# Patient Record
Sex: MALE | Race: WHITE | NOT HISPANIC OR LATINO | Employment: OTHER | URBAN - METROPOLITAN AREA
[De-identification: names, ages, dates, MRNs, and addresses within clinical notes are randomized per-mention and may not be internally consistent; named-entity substitution may affect disease eponyms.]

---

## 2020-07-05 ENCOUNTER — LAB REQUISITION (OUTPATIENT)
Dept: LAB | Facility: HOSPITAL | Age: 65
End: 2020-07-05

## 2020-07-05 DIAGNOSIS — Z00.00 ROUTINE GENERAL MEDICAL EXAMINATION AT A HEALTH CARE FACILITY: ICD-10-CM

## 2020-07-05 LAB
ANION GAP SERPL CALCULATED.3IONS-SCNC: 14.6 MMOL/L (ref 5–15)
BUN SERPL-MCNC: 13 MG/DL (ref 8–23)
BUN/CREAT SERPL: 15.5 (ref 7–25)
CALCIUM SPEC-SCNC: 8.5 MG/DL (ref 8.6–10.5)
CHLORIDE SERPL-SCNC: 103 MMOL/L (ref 98–107)
CO2 SERPL-SCNC: 23.4 MMOL/L (ref 22–29)
CREAT SERPL-MCNC: 0.84 MG/DL (ref 0.76–1.27)
GFR SERPL CREATININE-BSD FRML MDRD: 92 ML/MIN/1.73
GLUCOSE SERPL-MCNC: 152 MG/DL (ref 65–99)
POTASSIUM SERPL-SCNC: 2.6 MMOL/L (ref 3.5–5.2)
SODIUM SERPL-SCNC: 141 MMOL/L (ref 136–145)

## 2020-07-05 PROCEDURE — 80048 BASIC METABOLIC PNL TOTAL CA: CPT

## 2020-07-07 ENCOUNTER — LAB REQUISITION (OUTPATIENT)
Dept: LAB | Facility: HOSPITAL | Age: 65
End: 2020-07-07

## 2020-07-07 DIAGNOSIS — Z00.00 ROUTINE GENERAL MEDICAL EXAMINATION AT A HEALTH CARE FACILITY: ICD-10-CM

## 2020-07-07 LAB
MAGNESIUM SERPL-MCNC: 2 MG/DL (ref 1.6–2.4)
POTASSIUM SERPL-SCNC: 3.3 MMOL/L (ref 3.5–5.2)

## 2020-07-07 PROCEDURE — 83735 ASSAY OF MAGNESIUM: CPT

## 2020-07-07 PROCEDURE — 84132 ASSAY OF SERUM POTASSIUM: CPT

## 2021-06-21 ENCOUNTER — OFFICE VISIT (OUTPATIENT)
Dept: GASTROENTEROLOGY | Facility: CLINIC | Age: 66
End: 2021-06-21

## 2021-06-21 VITALS
DIASTOLIC BLOOD PRESSURE: 74 MMHG | WEIGHT: 185 LBS | BODY MASS INDEX: 24.52 KG/M2 | TEMPERATURE: 98.1 F | HEIGHT: 73 IN | HEART RATE: 66 BPM | SYSTOLIC BLOOD PRESSURE: 134 MMHG

## 2021-06-21 DIAGNOSIS — D64.9 ANEMIA, UNSPECIFIED TYPE: ICD-10-CM

## 2021-06-21 DIAGNOSIS — R63.4 WEIGHT LOSS: ICD-10-CM

## 2021-06-21 DIAGNOSIS — R74.8 ELEVATED ALKALINE PHOSPHATASE LEVEL: ICD-10-CM

## 2021-06-21 DIAGNOSIS — R19.7 DIARRHEA, UNSPECIFIED TYPE: ICD-10-CM

## 2021-06-21 DIAGNOSIS — R10.30 LOWER ABDOMINAL PAIN: Primary | ICD-10-CM

## 2021-06-21 PROBLEM — E87.6 HYPOKALEMIA: Status: ACTIVE | Noted: 2021-02-10

## 2021-06-21 PROBLEM — R10.9 ABDOMINAL PAIN: Status: ACTIVE | Noted: 2020-05-22

## 2021-06-21 PROBLEM — L03.90 CELLULITIS: Status: ACTIVE | Noted: 2020-05-27

## 2021-06-21 PROBLEM — K59.81: Status: ACTIVE | Noted: 2020-05-26

## 2021-06-21 PROBLEM — R91.1 PULMONARY NODULE: Status: ACTIVE | Noted: 2021-03-16

## 2021-06-21 PROBLEM — E63.9 UNDERNUTRITION: Status: ACTIVE | Noted: 2021-06-21

## 2021-06-21 PROBLEM — R56.9 SEIZURES (HCC): Status: ACTIVE | Noted: 2021-06-21

## 2021-06-21 PROCEDURE — 99204 OFFICE O/P NEW MOD 45 MIN: CPT | Performed by: NURSE PRACTITIONER

## 2021-06-21 RX ORDER — DICYCLOMINE HCL 20 MG
TABLET ORAL
Qty: 90 TABLET | Refills: 0 | Status: SHIPPED | OUTPATIENT
Start: 2021-06-21 | End: 2021-07-06

## 2021-06-21 RX ORDER — CARBAMAZEPINE 200 MG/1
200 TABLET ORAL 3 TIMES DAILY
COMMUNITY
Start: 2021-06-03

## 2021-06-21 RX ORDER — POTASSIUM CHLORIDE 1500 MG/1
TABLET, EXTENDED RELEASE ORAL
COMMUNITY
Start: 2021-04-03

## 2021-06-21 RX ORDER — POLYETHYLENE GLYCOL 3350 17 G/17G
17 POWDER, FOR SOLUTION ORAL DAILY
COMMUNITY
Start: 2021-02-16 | End: 2021-06-21
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
## 2021-06-28 ENCOUNTER — LAB (OUTPATIENT)
Dept: LAB | Facility: HOSPITAL | Age: 66
End: 2021-06-28

## 2021-06-28 DIAGNOSIS — R10.30 LOWER ABDOMINAL PAIN: Primary | ICD-10-CM

## 2021-06-28 DIAGNOSIS — R19.7 DIARRHEA, UNSPECIFIED TYPE: ICD-10-CM

## 2021-06-28 LAB
027 TOXIN: NORMAL
ALBUMIN SERPL-MCNC: 4.4 G/DL (ref 3.5–5.2)
ALBUMIN/GLOB SERPL: 1.5 G/DL
ALP SERPL-CCNC: 133 U/L (ref 39–117)
ALT SERPL W P-5'-P-CCNC: 12 U/L (ref 1–41)
ANION GAP SERPL CALCULATED.3IONS-SCNC: 11.3 MMOL/L (ref 5–15)
AST SERPL-CCNC: 19 U/L (ref 1–40)
BILIRUB SERPL-MCNC: 0.3 MG/DL (ref 0–1.2)
BUN SERPL-MCNC: 22 MG/DL (ref 8–23)
BUN/CREAT SERPL: 21.2 (ref 7–25)
C DIFF TOX GENS STL QL NAA+PROBE: NEGATIVE
CALCIUM SPEC-SCNC: 9.1 MG/DL (ref 8.6–10.5)
CHLORIDE SERPL-SCNC: 107 MMOL/L (ref 98–107)
CO2 SERPL-SCNC: 21.7 MMOL/L (ref 22–29)
CREAT SERPL-MCNC: 1.04 MG/DL (ref 0.76–1.27)
DEPRECATED RDW RBC AUTO: 47.4 FL (ref 37–54)
ERYTHROCYTE [DISTWIDTH] IN BLOOD BY AUTOMATED COUNT: 14.2 % (ref 12.3–15.4)
GFR SERPL CREATININE-BSD FRML MDRD: 72 ML/MIN/1.73
GLOBULIN UR ELPH-MCNC: 2.9 GM/DL
GLUCOSE SERPL-MCNC: 82 MG/DL (ref 65–99)
HCT VFR BLD AUTO: 33.9 % (ref 37.5–51)
HEMOCCULT STL QL: NEGATIVE
HGB BLD-MCNC: 11.5 G/DL (ref 13–17.7)
LACTOFERRIN STL QL LA: NEGATIVE
MCH RBC QN AUTO: 30.7 PG (ref 26.6–33)
MCHC RBC AUTO-ENTMCNC: 33.9 G/DL (ref 31.5–35.7)
MCV RBC AUTO: 90.6 FL (ref 79–97)
PLATELET # BLD AUTO: 238 10*3/MM3 (ref 140–450)
PMV BLD AUTO: 9.3 FL (ref 6–12)
POTASSIUM SERPL-SCNC: 3.7 MMOL/L (ref 3.5–5.2)
PROT SERPL-MCNC: 7.3 G/DL (ref 6–8.5)
RBC # BLD AUTO: 3.74 10*6/MM3 (ref 4.14–5.8)
SODIUM SERPL-SCNC: 140 MMOL/L (ref 136–145)
WBC # BLD AUTO: 5.33 10*3/MM3 (ref 3.4–10.8)

## 2021-06-28 PROCEDURE — 80053 COMPREHEN METABOLIC PANEL: CPT | Performed by: NURSE PRACTITIONER

## 2021-06-28 PROCEDURE — 87505 NFCT AGENT DETECTION GI: CPT

## 2021-06-28 PROCEDURE — 87493 C DIFF AMPLIFIED PROBE: CPT | Performed by: NURSE PRACTITIONER

## 2021-06-28 PROCEDURE — 85027 COMPLETE CBC AUTOMATED: CPT | Performed by: NURSE PRACTITIONER

## 2021-06-28 PROCEDURE — 36415 COLL VENOUS BLD VENIPUNCTURE: CPT | Performed by: NURSE PRACTITIONER

## 2021-06-28 PROCEDURE — 82272 OCCULT BLD FECES 1-3 TESTS: CPT | Performed by: NURSE PRACTITIONER

## 2021-06-28 PROCEDURE — 83630 LACTOFERRIN FECAL (QUAL): CPT | Performed by: NURSE PRACTITIONER

## 2021-06-29 LAB
CRYPTOSP DNA STL QL NAA+NON-PROBE: NOT DETECTED
E HISTOLYT DNA STL QL NAA+NON-PROBE: NOT DETECTED
G LAMBLIA DNA STL QL NAA+NON-PROBE: NOT DETECTED

## 2021-06-29 NOTE — PROGRESS NOTES
Awaiting records from [facility] see plan last note. Please check on

## 2021-07-01 ENCOUNTER — TELEPHONE (OUTPATIENT)
Dept: GASTROENTEROLOGY | Facility: CLINIC | Age: 66
End: 2021-07-01

## 2021-07-01 NOTE — TELEPHONE ENCOUNTER
I called both [facility] and [facility] Medical Records and had to fax a request. Faxed request to both and will F/U to make sure they received so we can obtain records.

## 2021-07-01 NOTE — TELEPHONE ENCOUNTER
----- Message from MILTON Davies sent at 6/28/2021  9:55 PM EDT -----  Awaiting records from [facility] see plan last note. Please check on

## 2021-07-06 RX ORDER — DICYCLOMINE HCL 20 MG
TABLET ORAL
Qty: 90 TABLET | Refills: 0 | Status: SHIPPED | OUTPATIENT
Start: 2021-07-06 | End: 2021-07-26

## 2021-07-08 ENCOUNTER — TELEPHONE (OUTPATIENT)
Dept: GASTROENTEROLOGY | Facility: CLINIC | Age: 66
End: 2021-07-08

## 2021-07-08 NOTE — TELEPHONE ENCOUNTER
I have called both hospitals and faxed medical records releases again and we have still yet to receive anything from them. Please advise

## 2021-07-08 NOTE — TELEPHONE ENCOUNTER
Please notify pt and request he try to obtain a copy of records for us due to us requesting but having difficulty obtaining. Wanted last scope and CT.

## 2021-07-08 NOTE — TELEPHONE ENCOUNTER
----- Message from MILTON Davies sent at 6/28/2021  9:55 PM EDT -----  Awaiting records from [facility] see plan last note. Please check on

## 2021-07-13 ENCOUNTER — TELEPHONE (OUTPATIENT)
Dept: GASTROENTEROLOGY | Facility: CLINIC | Age: 66
End: 2021-07-13

## 2021-07-13 NOTE — TELEPHONE ENCOUNTER
----- Message from MILTON Davies sent at 6/28/2021  9:55 PM EDT -----  Awaiting records from [facility] see plan last note. Please check on

## 2021-07-26 ENCOUNTER — PREP FOR SURGERY (OUTPATIENT)
Dept: OTHER | Facility: HOSPITAL | Age: 66
End: 2021-07-26

## 2021-07-26 DIAGNOSIS — R19.7 DIARRHEA, UNSPECIFIED TYPE: ICD-10-CM

## 2021-07-26 DIAGNOSIS — R10.9 ABDOMINAL PAIN, UNSPECIFIED ABDOMINAL LOCATION: Primary | ICD-10-CM

## 2021-07-26 RX ORDER — POLYETHYLENE GLYCOL 3350, SODIUM CHLORIDE, SODIUM BICARBONATE, POTASSIUM CHLORIDE 420; 11.2; 5.72; 1.48 G/4L; G/4L; G/4L; G/4L
4000 POWDER, FOR SOLUTION ORAL ONCE
Qty: 4000 ML | Refills: 0 | Status: SHIPPED | OUTPATIENT
Start: 2021-07-26 | End: 2021-07-26

## 2021-07-26 RX ORDER — DICYCLOMINE HCL 20 MG
TABLET ORAL
Qty: 90 TABLET | Refills: 0 | Status: SHIPPED | OUTPATIENT
Start: 2021-07-26 | End: 2021-08-11 | Stop reason: SDUPTHER

## 2021-07-26 NOTE — TELEPHONE ENCOUNTER
While working an in-basket message, I s/w pt and advised of plan of recommending Colonoscopy. Pt agreeable. Please cosign 4L prep for procedure (pt has hx of seizures).

## 2021-07-26 NOTE — TELEPHONE ENCOUNTER
Pt called and stated he was having trouble with bloating. Pt states he is eating okay but does still have issues with diarrhea. Pt wants to know what he can do for bloating.

## 2021-07-26 NOTE — TELEPHONE ENCOUNTER
----- Message from MILTON Davies sent at 7/20/2021  5:16 PM EDT -----  Please notify patient I received is EGD report which showed H. pylori, and an abdominal x-ray, however I did not receive a colonoscopy or CT scan.  His stools received and so far have been negative.  So for his complaints of lower abdominal pain and diarrhea I would recommend Dr. Garcia perform a colonoscopy on patient, please see if he is agreeable.

## 2021-07-27 PROBLEM — R19.7 DIARRHEA: Status: ACTIVE | Noted: 2021-07-27

## 2021-08-11 ENCOUNTER — TELEPHONE (OUTPATIENT)
Dept: GASTROENTEROLOGY | Facility: CLINIC | Age: 66
End: 2021-08-11

## 2021-08-11 RX ORDER — DICYCLOMINE HCL 20 MG
20 TABLET ORAL EVERY 6 HOURS
Qty: 90 TABLET | Refills: 0 | Status: SHIPPED | OUTPATIENT
Start: 2021-08-11 | End: 2021-09-07

## 2021-08-11 NOTE — TELEPHONE ENCOUNTER
Patient called and is requesting a refill on dicyclomine for cramping. Patient uses [facility] pharmacy in [place].

## 2021-08-27 RX ORDER — MULTIPLE VITAMINS W/ MINERALS TAB 9MG-400MCG
1 TAB ORAL DAILY
COMMUNITY

## 2021-08-30 ENCOUNTER — ANESTHESIA (OUTPATIENT)
Dept: GASTROENTEROLOGY | Facility: HOSPITAL | Age: 66
End: 2021-08-30

## 2021-08-30 ENCOUNTER — HOSPITAL ENCOUNTER (OUTPATIENT)
Facility: HOSPITAL | Age: 66
Setting detail: HOSPITAL OUTPATIENT SURGERY
Discharge: HOME | End: 2021-08-30
Attending: INTERNAL MEDICINE | Admitting: INTERNAL MEDICINE

## 2021-08-30 ENCOUNTER — ANESTHESIA EVENT (OUTPATIENT)
Dept: GASTROENTEROLOGY | Facility: HOSPITAL | Age: 66
End: 2021-08-30

## 2021-08-30 VITALS
SYSTOLIC BLOOD PRESSURE: 130 MMHG | HEART RATE: 67 BPM | HEIGHT: 73 IN | OXYGEN SATURATION: 98 % | WEIGHT: 185.63 LBS | BODY MASS INDEX: 24.6 KG/M2 | DIASTOLIC BLOOD PRESSURE: 79 MMHG | TEMPERATURE: 97.3 F | RESPIRATION RATE: 16 BRPM

## 2021-08-30 DIAGNOSIS — R19.7 DIARRHEA, UNSPECIFIED TYPE: Primary | ICD-10-CM

## 2021-08-30 PROCEDURE — 45378 DIAGNOSTIC COLONOSCOPY: CPT | Performed by: INTERNAL MEDICINE

## 2021-08-30 PROCEDURE — 25010000002 PROPOFOL 10 MG/ML EMULSION: Performed by: NURSE ANESTHETIST, CERTIFIED REGISTERED

## 2021-08-30 RX ORDER — SODIUM CHLORIDE, SODIUM LACTATE, POTASSIUM CHLORIDE, CALCIUM CHLORIDE 600; 310; 30; 20 MG/100ML; MG/100ML; MG/100ML; MG/100ML
30 INJECTION, SOLUTION INTRAVENOUS CONTINUOUS
Status: DISCONTINUED | OUTPATIENT
Start: 2021-08-30 | End: 2021-08-30 | Stop reason: HOSPADM

## 2021-08-30 RX ORDER — PROPOFOL 10 MG/ML
VIAL (ML) INTRAVENOUS AS NEEDED
Status: DISCONTINUED | OUTPATIENT
Start: 2021-08-30 | End: 2021-08-30 | Stop reason: SURG

## 2021-08-30 RX ADMIN — PROPOFOL 200 MCG/KG/MIN: 10 INJECTION, EMULSION INTRAVENOUS at 13:50

## 2021-08-30 RX ADMIN — PROPOFOL 100 MG: 10 INJECTION, EMULSION INTRAVENOUS at 13:50

## 2021-08-30 RX ADMIN — SODIUM CHLORIDE, POTASSIUM CHLORIDE, SODIUM LACTATE AND CALCIUM CHLORIDE 30 ML/HR: 600; 310; 30; 20 INJECTION, SOLUTION INTRAVENOUS at 12:57

## 2021-08-30 NOTE — ANESTHESIA POSTPROCEDURE EVALUATION
Patient: Raul Mitchell    Procedure Summary     Date: 08/30/21 Room / Location: [facility] ENDOSCOPY 1 / [facility] ENDOSCOPY    Anesthesia Start: 1347 Anesthesia Stop: 1417    Procedure: FLEXIBLE SIGMOIDOSCOPY (N/A ) Diagnosis:       Abdominal pain, unspecified abdominal location      Diarrhea, unspecified type      (Abdominal pain, unspecified abdominal location [R10.9])      (Diarrhea, unspecified type [R19.7])    Surgeons: Pal Garcia MD Provider: Goldberg, Michael, MD    Anesthesia Type: general ASA Status: 3          Anesthesia Type: general    Vitals  Vitals Value Taken Time   /79 08/30/21 1431   Temp 36.3 °C (97.3 °F) 08/30/21 1431   Pulse 67 08/30/21 1431   Resp 16 08/30/21 1431   SpO2 98 % 08/30/21 1431           Post Anesthesia Care and Evaluation    Patient location during evaluation: bedside  Patient participation: complete - patient participated  Level of consciousness: awake and alert  Pain management: adequate  Airway patency: patent  Anesthetic complications: No anesthetic complications  PONV Status: none  Cardiovascular status: acceptable  Respiratory status: acceptable  Hydration status: acceptable

## 2021-08-30 NOTE — ANESTHESIA PREPROCEDURE EVALUATION
Anesthesia Evaluation     Patient summary reviewed and Nursing notes reviewed   no history of anesthetic complications:  NPO Solid Status: > 8 hours  NPO Liquid Status: > 2 hours           Airway   Dental      Pulmonary - negative pulmonary ROS and normal exam    breath sounds clear to auscultation  Cardiovascular - normal exam  Exercise tolerance: good (4-7 METS)    Rhythm: regular    (+) past MI ,       Neuro/Psych  (+) seizures, psychiatric history,     GI/Hepatic/Renal/Endo - negative ROS     Musculoskeletal     Abdominal    Substance History - negative use     OB/GYN negative ob/gyn ROS         Other   arthritis,                      Anesthesia Plan    ASA 3     general   (Total IV Anesthesia    Patient understands anesthesia not responsible for dental damage.  )  intravenous induction     Anesthetic plan, all risks, benefits, and alternatives have been provided, discussed and informed consent has been obtained with: patient.    Plan discussed with CRNA.

## 2021-09-07 RX ORDER — DICYCLOMINE HCL 20 MG
TABLET ORAL
Qty: 90 TABLET | Refills: 0 | Status: SHIPPED | OUTPATIENT
Start: 2021-09-07 | End: 2021-10-05

## 2021-09-13 ENCOUNTER — ANESTHESIA EVENT (OUTPATIENT)
Dept: GASTROENTEROLOGY | Facility: HOSPITAL | Age: 66
End: 2021-09-13

## 2021-09-13 ENCOUNTER — ANESTHESIA (OUTPATIENT)
Dept: GASTROENTEROLOGY | Facility: HOSPITAL | Age: 66
End: 2021-09-13

## 2021-09-13 ENCOUNTER — HOSPITAL ENCOUNTER (OUTPATIENT)
Facility: HOSPITAL | Age: 66
Setting detail: HOSPITAL OUTPATIENT SURGERY
Discharge: HOME | End: 2021-09-13
Attending: INTERNAL MEDICINE | Admitting: INTERNAL MEDICINE

## 2021-09-13 VITALS
BODY MASS INDEX: 24.15 KG/M2 | WEIGHT: 182.98 LBS | SYSTOLIC BLOOD PRESSURE: 118 MMHG | DIASTOLIC BLOOD PRESSURE: 75 MMHG | TEMPERATURE: 97 F | HEART RATE: 61 BPM | RESPIRATION RATE: 14 BRPM | OXYGEN SATURATION: 98 %

## 2021-09-13 DIAGNOSIS — R19.7 DIARRHEA, UNSPECIFIED TYPE: Primary | ICD-10-CM

## 2021-09-13 PROCEDURE — 25010000002 PROPOFOL 10 MG/ML EMULSION: Performed by: NURSE ANESTHETIST, CERTIFIED REGISTERED

## 2021-09-13 PROCEDURE — 45378 DIAGNOSTIC COLONOSCOPY: CPT | Performed by: INTERNAL MEDICINE

## 2021-09-13 RX ORDER — SODIUM CHLORIDE, SODIUM LACTATE, POTASSIUM CHLORIDE, CALCIUM CHLORIDE 600; 310; 30; 20 MG/100ML; MG/100ML; MG/100ML; MG/100ML
30 INJECTION, SOLUTION INTRAVENOUS CONTINUOUS
Status: DISCONTINUED | OUTPATIENT
Start: 2021-09-13 | End: 2021-09-13 | Stop reason: HOSPADM

## 2021-09-13 RX ORDER — SODIUM CHLORIDE, SODIUM LACTATE, POTASSIUM CHLORIDE, CALCIUM CHLORIDE 600; 310; 30; 20 MG/100ML; MG/100ML; MG/100ML; MG/100ML
1000 INJECTION, SOLUTION INTRAVENOUS CONTINUOUS
Status: DISCONTINUED | OUTPATIENT
Start: 2021-09-13 | End: 2021-09-13 | Stop reason: HOSPADM

## 2021-09-13 RX ORDER — LIDOCAINE HYDROCHLORIDE 20 MG/ML
INJECTION, SOLUTION INFILTRATION; PERINEURAL AS NEEDED
Status: DISCONTINUED | OUTPATIENT
Start: 2021-09-13 | End: 2021-09-13 | Stop reason: SURG

## 2021-09-13 RX ORDER — PROPOFOL 10 MG/ML
VIAL (ML) INTRAVENOUS AS NEEDED
Status: DISCONTINUED | OUTPATIENT
Start: 2021-09-13 | End: 2021-09-13 | Stop reason: SURG

## 2021-09-13 RX ORDER — SODIUM CHLORIDE 0.9 % (FLUSH) 0.9 %
10 SYRINGE (ML) INJECTION AS NEEDED
Status: DISCONTINUED | OUTPATIENT
Start: 2021-09-13 | End: 2021-09-13 | Stop reason: HOSPADM

## 2021-09-13 RX ADMIN — PROPOFOL 175 MCG/KG/MIN: 10 INJECTION, EMULSION INTRAVENOUS at 07:57

## 2021-09-13 RX ADMIN — LIDOCAINE HYDROCHLORIDE 100 MG: 20 INJECTION, SOLUTION INFILTRATION; PERINEURAL at 07:57

## 2021-09-13 RX ADMIN — SODIUM CHLORIDE, POTASSIUM CHLORIDE, SODIUM LACTATE AND CALCIUM CHLORIDE 1000 ML: 600; 310; 30; 20 INJECTION, SOLUTION INTRAVENOUS at 06:35

## 2021-09-13 RX ADMIN — PROPOFOL 50 MG: 10 INJECTION, EMULSION INTRAVENOUS at 07:57

## 2021-09-13 NOTE — H&P
Pre Procedure History & Physical    Chief Complaint:   Diarrhea    Subjective     HPI:   Diarrhea    Past Medical History:   Past Medical History:   Diagnosis Date   • Abdominal cramps    • Alcoholism (CMS/HCC) 1996   • Arthritis    • Diarrhea    • Hypokalemia    • Myocardial infarction (CMS/HCC) 06/2020    does not see cardiologist, states was not told had to f/u with cardio. denies CP.    • Seizures (CMS/HCC)     last one approx 1990's.        Past Surgical History:  Past Surgical History:   Procedure Laterality Date   • COLONOSCOPY  2020   • SIGMOIDOSCOPY N/A 8/30/2021    Procedure: FLEXIBLE SIGMOIDOSCOPY;  Surgeon: Pal Garcia MD;  Location: [facility] ENDOSCOPY;  Service: Gastroenterology;  Laterality: N/A;  POOR PREP   • UPPER GASTROINTESTINAL ENDOSCOPY  2020       Family History:  Family History   Problem Relation Age of Onset   • Colon cancer Neg Hx    • Malig Hyperthermia Neg Hx        Social History:   reports that he has never smoked. He has never used smokeless tobacco. He reports previous alcohol use. He reports that he does not use drugs.    Medications:   Medications Prior to Admission   Medication Sig Dispense Refill Last Dose   • carBAMazepine (TEGretol) 200 MG tablet Take 200 mg by mouth 3 (Three) Times a Day.   9/12/2021 at Unknown time   • dicyclomine (BENTYL) 20 MG tablet TAKE 1 TABLET BY MOUTH EVERY SIX HOURS. TAKE 30 MIN BEFORE MEAL AS NEEDED FOR ABDOMINAL CRAMPING 90 tablet 0 9/12/2021 at Unknown time   • KLOR-CON 20 MEQ CR tablet TAKE 2 TABLETS BY MOUTH 4 (FOUR) TIMES DAILY FOR 90 DAYS   9/12/2021 at Unknown time   • multivitamin with minerals tablet tablet Take 1 tablet by mouth Daily.   9/12/2021 at Unknown time       Allergies:  Patient has no known allergies.        Objective     Blood pressure 133/73, pulse 98, temperature 97.9 °F (36.6 °C), temperature source Tympanic, resp. rate 20, weight 83 kg (182 lb 15.7 oz), SpO2 95 %.    Physical Exam   Constitutional: Pt is oriented to  person, place, and time and well-developed, well-nourished, and in no distress.   Mouth/Throat: Oropharynx is clear and moist.   Neck: Normal range of motion.   Cardiovascular: Normal rate, regular rhythm and normal heart sounds.    Pulmonary/Chest: Effort normal and breath sounds normal.   Abdominal: Soft. Nontender  Skin: Skin is warm and dry.   Psychiatric: Mood, memory, affect and judgment normal.     Assessment/Plan     Diagnosis:  Diarrhea    Anticipated Surgical Procedure:  Colonoscopy    The risks, benefits, and alternatives of this procedure have been discussed with the patient or the responsible party- the patient understands and agrees to proceed.

## 2021-09-13 NOTE — ANESTHESIA POSTPROCEDURE EVALUATION
Patient: Raul Mitchell    Procedure Summary     Date: 09/13/21 Room / Location: [facility] ENDOSCOPY 4 / [facility] ENDOSCOPY    Anesthesia Start: 0755 Anesthesia Stop: 0828    Procedure: SIGMOIDOSCOPY FLEXIBLE Diagnosis:       Diarrhea, unspecified type      (Diarrhea, unspecified type [R19.7])    Surgeons: Pal Garcia MD Provider: Hussein Rivera MD    Anesthesia Type: general ASA Status: 2          Anesthesia Type: general    Vitals  Vitals Value Taken Time   /70 09/13/21 0837   Temp 36.4 °C (97.5 °F) 09/13/21 0827   Pulse 69 09/13/21 0837   Resp 20 09/13/21 0837   SpO2 98 % 09/13/21 0837           Post Anesthesia Care and Evaluation    Patient location during evaluation: bedside  Patient participation: complete - patient participated  Level of consciousness: awake and alert  Pain management: adequate  Airway patency: patent  Anesthetic complications: No anesthetic complications  PONV Status: none  Cardiovascular status: acceptable  Respiratory status: acceptable  Hydration status: acceptable

## 2021-09-13 NOTE — ANESTHESIA PREPROCEDURE EVALUATION
Anesthesia Evaluation     Patient summary reviewed and Nursing notes reviewed   no history of anesthetic complications:  NPO Solid Status: > 8 hours  NPO Liquid Status: > 2 hours           Airway   Mallampati: II  TM distance: >3 FB  Neck ROM: full  No difficulty expected  Dental    (+) edentulous    Pulmonary - negative pulmonary ROS and normal exam    breath sounds clear to auscultation  Cardiovascular - normal exam  Exercise tolerance: good (4-7 METS)    Rhythm: regular    (+) past MI ,       Neuro/Psych  (+) seizures, psychiatric history,     GI/Hepatic/Renal/Endo - negative ROS     Musculoskeletal     Abdominal    Substance History - negative use     OB/GYN negative ob/gyn ROS         Other   arthritis,                      Anesthesia Plan    ASA 2     general   (Total IV Anesthesia    Patient understands anesthesia not responsible for dental damage.  )  intravenous induction     Anesthetic plan, all risks, benefits, and alternatives have been provided, discussed and informed consent has been obtained with: patient.    Plan discussed with CRNA.

## 2021-09-14 ENCOUNTER — ANESTHESIA (OUTPATIENT)
Dept: GASTROENTEROLOGY | Facility: HOSPITAL | Age: 66
End: 2021-09-14

## 2021-09-14 ENCOUNTER — ANESTHESIA EVENT (OUTPATIENT)
Dept: GASTROENTEROLOGY | Facility: HOSPITAL | Age: 66
End: 2021-09-14

## 2021-09-14 ENCOUNTER — HOSPITAL ENCOUNTER (OUTPATIENT)
Facility: HOSPITAL | Age: 66
Setting detail: HOSPITAL OUTPATIENT SURGERY
Discharge: HOME | End: 2021-09-14
Attending: INTERNAL MEDICINE | Admitting: INTERNAL MEDICINE

## 2021-09-14 VITALS
WEIGHT: 182.1 LBS | SYSTOLIC BLOOD PRESSURE: 120 MMHG | HEART RATE: 76 BPM | TEMPERATURE: 97.6 F | BODY MASS INDEX: 24.03 KG/M2 | RESPIRATION RATE: 16 BRPM | DIASTOLIC BLOOD PRESSURE: 79 MMHG

## 2021-09-14 RX ORDER — SODIUM CHLORIDE, SODIUM LACTATE, POTASSIUM CHLORIDE, CALCIUM CHLORIDE 600; 310; 30; 20 MG/100ML; MG/100ML; MG/100ML; MG/100ML
1000 INJECTION, SOLUTION INTRAVENOUS CONTINUOUS
Status: DISCONTINUED | OUTPATIENT
Start: 2021-09-14 | End: 2021-09-14 | Stop reason: HOSPADM

## 2021-09-14 RX ORDER — SODIUM CHLORIDE, SODIUM LACTATE, POTASSIUM CHLORIDE, CALCIUM CHLORIDE 600; 310; 30; 20 MG/100ML; MG/100ML; MG/100ML; MG/100ML
30 INJECTION, SOLUTION INTRAVENOUS CONTINUOUS
Status: DISCONTINUED | OUTPATIENT
Start: 2021-09-14 | End: 2021-09-14 | Stop reason: HOSPADM

## 2021-09-14 RX ORDER — SODIUM CHLORIDE 0.9 % (FLUSH) 0.9 %
10 SYRINGE (ML) INJECTION AS NEEDED
Status: DISCONTINUED | OUTPATIENT
Start: 2021-09-14 | End: 2021-09-14 | Stop reason: HOSPADM

## 2021-09-14 RX ADMIN — SODIUM CHLORIDE, POTASSIUM CHLORIDE, SODIUM LACTATE AND CALCIUM CHLORIDE 1000 ML: 600; 310; 30; 20 INJECTION, SOLUTION INTRAVENOUS at 06:27

## 2021-09-16 ENCOUNTER — TELEPHONE (OUTPATIENT)
Dept: GASTROENTEROLOGY | Facility: CLINIC | Age: 66
End: 2021-09-16

## 2021-09-16 NOTE — TELEPHONE ENCOUNTER
Patient has had poor prep x2 and was supposed to return for repeat colonoscopy on 9/14, I cannot tell if he did or not so I am just trying to check on this.  It may not be back yet.  If he refuses to repeat it schedule a follow-up

## 2021-09-20 NOTE — TELEPHONE ENCOUNTER
I s/w pt. He is unwilling to schedule another colonoscopy & would prefer to just have a follow-up.     We had over 5 appointments available for this week that I offered to patient but he is not able to make any of those, so, our next available was 11/29/21. Pt will call back and check for cancellations.

## 2021-09-22 ENCOUNTER — OFFICE VISIT (OUTPATIENT)
Dept: GASTROENTEROLOGY | Facility: CLINIC | Age: 66
End: 2021-09-22

## 2021-09-22 VITALS
TEMPERATURE: 98.2 F | BODY MASS INDEX: 25.13 KG/M2 | HEIGHT: 73 IN | HEART RATE: 57 BPM | DIASTOLIC BLOOD PRESSURE: 75 MMHG | WEIGHT: 189.6 LBS | SYSTOLIC BLOOD PRESSURE: 141 MMHG

## 2021-09-22 DIAGNOSIS — D64.9 ANEMIA, UNSPECIFIED TYPE: ICD-10-CM

## 2021-09-22 DIAGNOSIS — K29.70 HELICOBACTER PYLORI GASTRITIS: ICD-10-CM

## 2021-09-22 DIAGNOSIS — R63.4 WEIGHT LOSS: ICD-10-CM

## 2021-09-22 DIAGNOSIS — B96.81 HELICOBACTER PYLORI GASTRITIS: ICD-10-CM

## 2021-09-22 DIAGNOSIS — R19.7 DIARRHEA, UNSPECIFIED TYPE: Primary | ICD-10-CM

## 2021-09-22 PROCEDURE — 99214 OFFICE O/P EST MOD 30 MIN: CPT | Performed by: NURSE PRACTITIONER
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
## 2021-10-05 RX ORDER — DICYCLOMINE HCL 20 MG
TABLET ORAL
Qty: 90 TABLET | Refills: 0 | Status: SHIPPED | OUTPATIENT
Start: 2021-10-05 | End: 2021-10-29

## 2021-10-11 ENCOUNTER — HOSPITAL ENCOUNTER (OUTPATIENT)
Dept: CT IMAGING | Facility: HOSPITAL | Age: 66
Discharge: HOME | End: 2021-10-11

## 2021-10-11 ENCOUNTER — LAB (OUTPATIENT)
Dept: LAB | Facility: HOSPITAL | Age: 66
End: 2021-10-11

## 2021-10-11 DIAGNOSIS — D64.9 ANEMIA, UNSPECIFIED TYPE: ICD-10-CM

## 2021-10-11 DIAGNOSIS — R19.7 DIARRHEA, UNSPECIFIED TYPE: ICD-10-CM

## 2021-10-11 LAB
CREAT BLDA-MCNC: 1.2 MG/DL
DEPRECATED RDW RBC AUTO: 45.4 FL (ref 37–54)
ERYTHROCYTE [DISTWIDTH] IN BLOOD BY AUTOMATED COUNT: 12.9 % (ref 12.3–15.4)
FERRITIN SERPL-MCNC: 439 NG/ML (ref 30–400)
HCT VFR BLD AUTO: 39.8 % (ref 37.5–51)
HGB BLD-MCNC: 12.6 G/DL (ref 13–17.7)
IRON 24H UR-MRATE: 88 MCG/DL (ref 59–158)
IRON SATN MFR SERPL: 33 % (ref 20–50)
MCH RBC QN AUTO: 30.2 PG (ref 26.6–33)
MCHC RBC AUTO-ENTMCNC: 31.7 G/DL (ref 31.5–35.7)
MCV RBC AUTO: 95.4 FL (ref 79–97)
PLATELET # BLD AUTO: 206 10*3/MM3 (ref 140–450)
PMV BLD AUTO: 10.2 FL (ref 6–12)
RBC # BLD AUTO: 4.17 10*6/MM3 (ref 4.14–5.8)
TIBC SERPL-MCNC: 264 MCG/DL (ref 298–536)
TRANSFERRIN SERPL-MCNC: 177 MG/DL (ref 200–360)
WBC # BLD AUTO: 4.82 10*3/MM3 (ref 3.4–10.8)

## 2021-10-11 PROCEDURE — 83540 ASSAY OF IRON: CPT

## 2021-10-11 PROCEDURE — 74177 CT ABD & PELVIS W/CONTRAST: CPT

## 2021-10-11 PROCEDURE — 0 IOPAMIDOL PER 1 ML: Performed by: NURSE PRACTITIONER

## 2021-10-11 PROCEDURE — 84466 ASSAY OF TRANSFERRIN: CPT

## 2021-10-11 PROCEDURE — 82565 ASSAY OF CREATININE: CPT

## 2021-10-11 PROCEDURE — 36415 COLL VENOUS BLD VENIPUNCTURE: CPT

## 2021-10-11 PROCEDURE — 82728 ASSAY OF FERRITIN: CPT

## 2021-10-11 PROCEDURE — 85027 COMPLETE CBC AUTOMATED: CPT

## 2021-10-11 RX ADMIN — IOPAMIDOL 100 ML: 755 INJECTION, SOLUTION INTRAVENOUS at 13:21

## 2021-10-13 ENCOUNTER — OFFICE VISIT (OUTPATIENT)
Dept: GASTROENTEROLOGY | Facility: CLINIC | Age: 66
End: 2021-10-13

## 2021-10-13 VITALS
SYSTOLIC BLOOD PRESSURE: 133 MMHG | DIASTOLIC BLOOD PRESSURE: 93 MMHG | HEART RATE: 67 BPM | BODY MASS INDEX: 24.86 KG/M2 | HEIGHT: 73 IN | WEIGHT: 187.6 LBS

## 2021-10-13 DIAGNOSIS — B96.81 HELICOBACTER PYLORI GASTRITIS: ICD-10-CM

## 2021-10-13 DIAGNOSIS — K29.70 HELICOBACTER PYLORI GASTRITIS: ICD-10-CM

## 2021-10-13 DIAGNOSIS — R93.3 ABNORMAL CT SCAN, COLON: Primary | ICD-10-CM

## 2021-10-13 PROCEDURE — 99213 OFFICE O/P EST LOW 20 MIN: CPT | Performed by: INTERNAL MEDICINE

## 2021-10-13 NOTE — PROGRESS NOTES
"Chief Complaint  Imaging Only (Follow up CT Results)    Subjective          History of Present Illness   Raul Mitchell presents to [facility].  Patient has problem with chronic constipation.  He has had 3 attempts to do colonoscopy which failed because of poor prep.  We have had a long discussion with the patient in the hospital about the importance of colonoscopy and the possibility of missing the diagnosis of polyps and early cancer which can become incurable by the delay of patient recently had a CT scan of abdomen pelvis which shows dilated right colon with possibility of mass close to hepatic flexure.  I have thoroughly discussed th these findings with the patient.  Patient fully understands but he does not want to have any more testing including radiological procedures, endoscopy procedures or any other tests done.  He currently feels well he is having regular bowel movements every day.  He denies any abdominal pain, nausea vomiting, weight loss, bleeding.  He is told me that if things change or he changes his mind he will let me know.    Objective   Vital Signs:   /93 (BP Location: Left arm, Patient Position: Sitting, Cuff Size: Adult)   Pulse 67   Ht 185.4 cm (72.99\")   Wt 85.1 kg (187 lb 9.6 oz)   BMI 24.76 kg/m²     Physical Exam  Constitutional:       General: He is awake. He is not in acute distress.     Appearance: Normal appearance. He is well-developed and well-groomed.   HENT:      Head: Normocephalic and atraumatic.      Mouth/Throat:      Mouth: Mucous membranes are moist.      Comments: Lacey dental hygiene is good  Eyes:      General: Lids are normal.      Conjunctiva/sclera: Conjunctivae normal.      Pupils: Pupils are equal, round, and reactive to light.   Neck:      Thyroid: No thyroid mass.      Trachea: Trachea normal.   Cardiovascular:      Rate and Rhythm: Normal rate and regular rhythm.      Heart sounds: Normal heart sounds.   Pulmonary:      " Effort: Pulmonary effort is normal.      Breath sounds: Normal breath sounds and air entry.   Abdominal:      General: Abdomen is flat. Bowel sounds are normal. There is no distension.      Palpations: Abdomen is soft. There is no mass.      Tenderness: There is no abdominal tenderness. There is no guarding.   Musculoskeletal:      Cervical back: Neck supple.      Right lower leg: No edema.      Left lower leg: No edema.   Skin:     General: Skin is warm and moist.      Coloration: Skin is not cyanotic, jaundiced or pale.      Findings: No rash.      Nails: There is no clubbing.   Neurological:      Mental Status: He is alert and oriented to person, place, and time.   Psychiatric:         Attention and Perception: Attention normal.         Mood and Affect: Mood and affect normal.         Speech: Speech normal.        Result Review :            Assessment and Plan    Diagnoses and all orders for this visit:    1. Abnormal CT scan, colon (Primary)  Assessment & Plan:  Dilation of Colon, Mild Thickening of Colon      2. Helicobacter pylori gastritis    Follow Up   No follow-ups on file.  Patient was given instructions and counseling regarding his condition or for health maintenance advice. Please see specific information pulled into the AVS if appropriate.     PLAN:  1. Follow-up as needed.

## 2021-10-29 RX ORDER — DICYCLOMINE HCL 20 MG
TABLET ORAL
Qty: 90 TABLET | Refills: 0 | Status: SHIPPED | OUTPATIENT
Start: 2021-10-29 | End: 2021-12-03

## 2021-12-03 RX ORDER — DICYCLOMINE HCL 20 MG
TABLET ORAL
Qty: 90 TABLET | Refills: 0 | Status: SHIPPED | OUTPATIENT
Start: 2021-12-03 | End: 2022-01-03

## 2021-12-20 ENCOUNTER — OFFICE VISIT (OUTPATIENT)
Dept: GASTROENTEROLOGY | Facility: CLINIC | Age: 66
End: 2021-12-20

## 2021-12-20 VITALS
HEIGHT: 73 IN | TEMPERATURE: 98 F | WEIGHT: 202.5 LBS | HEART RATE: 84 BPM | SYSTOLIC BLOOD PRESSURE: 155 MMHG | BODY MASS INDEX: 26.84 KG/M2 | OXYGEN SATURATION: 99 % | DIASTOLIC BLOOD PRESSURE: 83 MMHG

## 2021-12-20 DIAGNOSIS — K29.70 HELICOBACTER PYLORI GASTRITIS: ICD-10-CM

## 2021-12-20 DIAGNOSIS — R93.3 ABNORMAL CT SCAN, COLON: Primary | ICD-10-CM

## 2021-12-20 DIAGNOSIS — B96.81 HELICOBACTER PYLORI GASTRITIS: ICD-10-CM

## 2021-12-20 PROCEDURE — 99213 OFFICE O/P EST LOW 20 MIN: CPT | Performed by: NURSE PRACTITIONER
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
## 2021-12-29 ENCOUNTER — LAB (OUTPATIENT)
Dept: LAB | Facility: HOSPITAL | Age: 66
End: 2021-12-29

## 2021-12-29 DIAGNOSIS — R19.7 DIARRHEA, UNSPECIFIED TYPE: ICD-10-CM

## 2021-12-29 DIAGNOSIS — B96.81 HELICOBACTER PYLORI GASTRITIS: ICD-10-CM

## 2021-12-29 DIAGNOSIS — K29.70 HELICOBACTER PYLORI GASTRITIS: ICD-10-CM

## 2021-12-29 LAB — UREA BREATH TEST QL: NEGATIVE

## 2021-12-29 PROCEDURE — 83013 H PYLORI (C-13) BREATH: CPT

## 2022-01-03 RX ORDER — DICYCLOMINE HCL 20 MG
TABLET ORAL
Qty: 60 TABLET | Refills: 0 | Status: SHIPPED | OUTPATIENT
Start: 2022-01-03

## 2022-07-21 ENCOUNTER — HOSPITAL ENCOUNTER (INPATIENT)
Dept: HOSPITAL 49 - FAS | Age: 67
LOS: 8 days | Discharge: HOME | DRG: 330 | End: 2022-07-29
Attending: INTERNAL MEDICINE | Admitting: INTERNAL MEDICINE
Payer: MEDICARE

## 2022-07-21 VITALS — BODY MASS INDEX: 28.23 KG/M2 | HEIGHT: 72.99 IN | WEIGHT: 213 LBS

## 2022-07-21 DIAGNOSIS — K91.89: Primary | ICD-10-CM

## 2022-07-21 DIAGNOSIS — D62: ICD-10-CM

## 2022-07-21 DIAGNOSIS — K81.9: ICD-10-CM

## 2022-07-21 DIAGNOSIS — K56.7: ICD-10-CM

## 2022-07-21 DIAGNOSIS — Z79.899: ICD-10-CM

## 2022-07-21 DIAGNOSIS — N18.2: ICD-10-CM

## 2022-07-21 DIAGNOSIS — K91.71: ICD-10-CM

## 2022-07-21 DIAGNOSIS — D50.9: ICD-10-CM

## 2022-07-21 DIAGNOSIS — K59.39: ICD-10-CM

## 2022-07-21 DIAGNOSIS — I12.9: ICD-10-CM

## 2022-07-21 DIAGNOSIS — E87.6: ICD-10-CM

## 2022-07-21 DIAGNOSIS — K59.81: ICD-10-CM

## 2022-07-21 DIAGNOSIS — K66.0: ICD-10-CM

## 2022-07-21 DIAGNOSIS — G40.909: ICD-10-CM

## 2022-07-21 LAB
ALBUMIN SERPL-MCNC: 3.8 G/DL (ref 3.4–5)
ALKALINE PHOSHATASE: 146 U/L (ref 46–116)
ALT SERPL-CCNC: 25 U/L (ref 16–63)
AST: 18 U/L (ref 15–37)
BILIRUBIN - TOTAL: 0.5 MG/DL (ref 0.2–1)
BUN SERPL-MCNC: 24 MG/DL (ref 7–18)
BUN/CREAT RATIO (CALC): 21.6 RATIO
CHLORIDE: 107 MMOL/L (ref 98–107)
CO2 (BICARBONATE): 22 MMOL/L (ref 21–32)
CREATININE: 1.11 MG/DL (ref 0.67–1.17)
GLOBULIN (CALCULATION): 3.7 G/DL
GLUCOSE SERPL-MCNC: 94 MG/DL (ref 74–106)
HCT: 38.3 % (ref 42–52)
HGB BLD-MCNC: 12.5 G/DL (ref 13.2–18)
MCH RBC QN AUTO: 30.4 PG (ref 25–31)
MCHC RBC AUTO-ENTMCNC: 32.6 G/DL (ref 32–36)
MCV: 93.2 FL (ref 78–100)
MPV: 9.2 FL (ref 6–9.5)
PLT: 222 K/UL (ref 150–400)
POTASSIUM: 3.5 MMOL/L (ref 3.5–5.1)
RBC: 4.11 M/UL (ref 4.7–6)
RDW: 12.7 % (ref 11.5–14)
TOTAL PROTEIN: 7.5 G/DL (ref 6.4–8.2)
WBC: 5.2 K/UL (ref 4–10.5)

## 2022-07-21 PROCEDURE — BF131ZZ FLUOROSCOPY OF GALLBLADDER AND BILE DUCTS USING LOW OSMOLAR CONTRAST: ICD-10-PCS

## 2022-07-21 PROCEDURE — 0FT44ZZ RESECTION OF GALLBLADDER, PERCUTANEOUS ENDOSCOPIC APPROACH: ICD-10-PCS | Performed by: SURGERY

## 2022-07-21 PROCEDURE — 0DNU0ZZ RELEASE OMENTUM, OPEN APPROACH: ICD-10-PCS | Performed by: SURGERY

## 2022-07-21 PROCEDURE — G0378 HOSPITAL OBSERVATION PER HR: HCPCS

## 2022-07-21 PROCEDURE — 0DQE0ZZ REPAIR LARGE INTESTINE, OPEN APPROACH: ICD-10-PCS

## 2022-07-22 LAB
BUN SERPL-MCNC: 26 MG/DL (ref 7–18)
BUN/CREAT RATIO (CALC): 18.4 RATIO
CHLORIDE: 108 MMOL/L (ref 98–107)
CO2 (BICARBONATE): 28 MMOL/L (ref 21–32)
CREATININE: 1.41 MG/DL (ref 0.67–1.17)
GLUCOSE SERPL-MCNC: 98 MG/DL (ref 74–106)
HCT: 36.4 % (ref 42–52)
HGB BLD-MCNC: 11.8 G/DL (ref 13.2–18)
IRON % SATURATION: 11.5 %SAT (ref 20–50)
IRON SERPL-MCNC: 14 UG/DL (ref 65–175)
MCH RBC QN AUTO: 30.7 PG (ref 25–31)
MCHC RBC AUTO-ENTMCNC: 32.4 G/DL (ref 32–36)
MCV: 94.8 FL (ref 78–100)
MPV: 9 FL (ref 6–9.5)
PLT: 161 K/UL (ref 150–400)
POTASSIUM: 3.9 MMOL/L (ref 3.5–5.1)
RBC: 3.84 M/UL (ref 4.7–6)
RDW: 12.9 % (ref 11.5–14)
WBC: 8.8 K/UL (ref 4–10.5)

## 2022-07-24 LAB
BUN SERPL-MCNC: 17 MG/DL (ref 7–18)
BUN/CREAT RATIO (CALC): 14 RATIO
CHLORIDE: 106 MMOL/L (ref 98–107)
CO2 (BICARBONATE): 28 MMOL/L (ref 21–32)
CREATININE: 1.21 MG/DL (ref 0.67–1.17)
GLUCOSE SERPL-MCNC: 88 MG/DL (ref 74–106)
HCT: 29.6 % (ref 42–52)
HGB BLD-MCNC: 9.9 G/DL (ref 13.2–18)
MCH RBC QN AUTO: 31.3 PG (ref 25–31)
MCHC RBC AUTO-ENTMCNC: 33.4 G/DL (ref 32–36)
MCV: 93.7 FL (ref 78–100)
MPV: 9.4 FL (ref 6–9.5)
PLT: 153 K/UL (ref 150–400)
POTASSIUM: 3.1 MMOL/L (ref 3.5–5.1)
RBC: 3.16 M/UL (ref 4.7–6)
RDW: 12.5 % (ref 11.5–14)
WBC: 4 K/UL (ref 4–10.5)

## 2022-07-25 LAB — HGB BLD-MCNC: 10.5 G/DL (ref 13.2–18)

## 2022-07-26 LAB
BUN SERPL-MCNC: 17 MG/DL (ref 7–18)
BUN/CREAT RATIO (CALC): 15.3 RATIO
CHLORIDE: 106 MMOL/L (ref 98–107)
CO2 (BICARBONATE): 27 MMOL/L (ref 21–32)
CREATININE: 1.11 MG/DL (ref 0.67–1.17)
GLUCOSE SERPL-MCNC: 86 MG/DL (ref 74–106)
HCT: 32.2 % (ref 42–52)
HGB BLD-MCNC: 10.8 G/DL (ref 13.2–18)
MCH RBC QN AUTO: 31.2 PG (ref 25–31)
MCHC RBC AUTO-ENTMCNC: 33.5 G/DL (ref 32–36)
MCV: 93.1 FL (ref 78–100)
MPV: 9.7 FL (ref 6–9.5)
PLT: 185 K/UL (ref 150–400)
POTASSIUM: 2.8 MMOL/L (ref 3.5–5.1)
RBC: 3.46 M/UL (ref 4.7–6)
RDW: 12.8 % (ref 11.5–14)
WBC: 3.4 K/UL (ref 4–10.5)

## 2022-07-26 PROCEDURE — 05HY33Z INSERTION OF INFUSION DEVICE INTO UPPER VEIN, PERCUTANEOUS APPROACH: ICD-10-PCS | Performed by: FAMILY MEDICINE

## 2022-07-27 LAB
BASOPHIL: 0.9 % (ref 0–2)
BUN SERPL-MCNC: 17 MG/DL (ref 7–18)
BUN/CREAT RATIO (CALC): 15.5 RATIO
CHLORIDE: 107 MMOL/L (ref 98–107)
CO2 (BICARBONATE): 28 MMOL/L (ref 21–32)
CREATININE: 1.1 MG/DL (ref 0.67–1.17)
EOSINOPHIL: 7.4 % (ref 0–7)
GLUCOSE SERPL-MCNC: 81 MG/DL (ref 74–106)
HCT: 31.6 % (ref 42–52)
HGB BLD-MCNC: 10.3 G/DL (ref 13.2–18)
LYMPHOCYTE: 22.2 % (ref 15–48)
MAGNESIUM SERPL-MCNC: 2.2 MG/DL (ref 1.8–2.4)
MCH RBC QN AUTO: 30.5 PG (ref 25–31)
MCHC RBC AUTO-ENTMCNC: 32.6 G/DL (ref 32–36)
MCV: 93.5 FL (ref 78–100)
MONOCYTE: 6.5 % (ref 0–12)
MPV: 9 FL (ref 6–9.5)
NEUTROPHIL: 62.8 % (ref 41–80)
NRBC: 0
PLT: 187 K/UL (ref 150–400)
POTASSIUM: 2.8 MMOL/L (ref 3.5–5.1)
RBC MORPHOLOGY: NORMAL
RBC: 3.38 M/UL (ref 4.7–6)
RDW: 12.8 % (ref 11.5–14)
WBC: 4.3 K/UL (ref 4–10.5)

## 2022-07-28 PROCEDURE — 0D9L8ZZ DRAINAGE OF TRANSVERSE COLON, VIA NATURAL OR ARTIFICIAL OPENING ENDOSCOPIC: ICD-10-PCS

## 2022-07-28 NOTE — NUR
ASSISTING WITH PREPARING PAPERWORK FOR COLONOSCOPY NOTED K+ 2.8, INFORMED DR MENJIVAR WHO STATED HE WILL ORDER SOME POTASSIUM

## 2022-07-28 NOTE — NUR
5:30 AM PATIENT AWAKE, CONSENT FOR COLONOSCOPY READ, EXPLAINED AND WITNESSED.
ORIGINAL PLACED ON PATIENT'S CHART.

## 2022-07-29 LAB
BASOPHIL: 0.8 % (ref 0–2)
BUN SERPL-MCNC: 12 MG/DL (ref 7–18)
BUN/CREAT RATIO (CALC): 12.1 RATIO
CHLORIDE: 106 MMOL/L (ref 98–107)
CO2 (BICARBONATE): 29 MMOL/L (ref 21–32)
CREATININE: 0.99 MG/DL (ref 0.67–1.17)
EOSINOPHIL: 7.4 % (ref 0–7)
GLUCOSE SERPL-MCNC: 91 MG/DL (ref 74–106)
HCT: 31.9 % (ref 42–52)
HGB BLD-MCNC: 10.5 G/DL (ref 13.2–18)
LYMPHOCYTE: 17.9 % (ref 15–48)
MAGNESIUM SERPL-MCNC: 2.2 MG/DL (ref 1.8–2.4)
MCH RBC QN AUTO: 30.4 PG (ref 25–31)
MCHC RBC AUTO-ENTMCNC: 32.9 G/DL (ref 32–36)
MCV: 92.5 FL (ref 78–100)
MONOCYTE: 7 % (ref 0–12)
MPV: 9.1 FL (ref 6–9.5)
NEUTROPHIL: 66.7 % (ref 41–80)
NRBC: 0
PLT: 222 K/UL (ref 150–400)
POTASSIUM: 3.6 MMOL/L (ref 3.5–5.1)
RBC MORPHOLOGY: NORMAL
RBC: 3.45 M/UL (ref 4.7–6)
RDW: 12.7 % (ref 11.5–14)
WBC: 4.9 K/UL (ref 4–10.5)

## 2022-07-29 NOTE — NUR
SPOKE TO PATIENT AGAIN; HE DECLINES NEED FOR HOME HEALTH AT THIS TIME. HE SAID
HE WOULD ASK (SVITLANA ) HIS NURSE PRACTIONER TO ORDRE FOR HIM BUT RIGHT NOW
DECLINES IT

## 2025-01-20 ENCOUNTER — INPATIENT HOSPITAL (OUTPATIENT)
Dept: URBAN - METROPOLITAN AREA HOSPITAL 107 | Facility: HOSPITAL | Age: 70
End: 2025-01-20
Payer: MEDICARE

## 2025-01-20 DIAGNOSIS — R93.3 ABNORMAL FINDINGS ON DIAGNOSTIC IMAGING OF OTHER PARTS OF DI: ICD-10-CM

## 2025-01-20 PROCEDURE — 99222 1ST HOSP IP/OBS MODERATE 55: CPT | Performed by: INTERNAL MEDICINE

## 2025-01-21 ENCOUNTER — INPATIENT HOSPITAL (OUTPATIENT)
Dept: URBAN - METROPOLITAN AREA HOSPITAL 107 | Facility: HOSPITAL | Age: 70
End: 2025-01-21
Payer: MEDICARE

## 2025-01-21 DIAGNOSIS — R93.3 ABNORMAL FINDINGS ON DIAGNOSTIC IMAGING OF OTHER PARTS OF DI: ICD-10-CM

## 2025-01-21 DIAGNOSIS — Z87.19 PERSONAL HISTORY OF OTHER DISEASES OF THE DIGESTIVE SYSTEM: ICD-10-CM

## 2025-01-21 PROCEDURE — 99233 SBSQ HOSP IP/OBS HIGH 50: CPT | Mod: FS | Performed by: PHYSICIAN ASSISTANT

## 2025-01-22 ENCOUNTER — INPATIENT HOSPITAL (OUTPATIENT)
Dept: URBAN - METROPOLITAN AREA HOSPITAL 107 | Facility: HOSPITAL | Age: 70
End: 2025-01-22
Payer: MEDICARE

## 2025-01-22 DIAGNOSIS — Z87.19 PERSONAL HISTORY OF OTHER DISEASES OF THE DIGESTIVE SYSTEM: ICD-10-CM

## 2025-01-22 DIAGNOSIS — R93.3 ABNORMAL FINDINGS ON DIAGNOSTIC IMAGING OF OTHER PARTS OF DI: ICD-10-CM

## 2025-01-22 PROCEDURE — 99233 SBSQ HOSP IP/OBS HIGH 50: CPT | Mod: FS | Performed by: PHYSICIAN ASSISTANT

## 2025-01-23 ENCOUNTER — INPATIENT HOSPITAL (OUTPATIENT)
Dept: URBAN - METROPOLITAN AREA HOSPITAL 107 | Facility: HOSPITAL | Age: 70
End: 2025-01-23
Payer: MEDICARE

## 2025-01-23 DIAGNOSIS — R93.3 ABNORMAL FINDINGS ON DIAGNOSTIC IMAGING OF OTHER PARTS OF DI: ICD-10-CM

## 2025-01-23 DIAGNOSIS — Z87.19 PERSONAL HISTORY OF OTHER DISEASES OF THE DIGESTIVE SYSTEM: ICD-10-CM

## 2025-01-23 PROCEDURE — 99233 SBSQ HOSP IP/OBS HIGH 50: CPT | Mod: FS | Performed by: PHYSICIAN ASSISTANT

## (undated) DEVICE — COLON KIT: Brand: MEDLINE INDUSTRIES, INC.

## (undated) DEVICE — Device: Brand: DEFENDO AIR/WATER/SUCTION AND BIOPSY VALVE

## (undated) DEVICE — SOL IRRG H2O PL/BG 1000ML STRL